# Patient Record
Sex: FEMALE | Race: BLACK OR AFRICAN AMERICAN | NOT HISPANIC OR LATINO | Employment: UNEMPLOYED | URBAN - METROPOLITAN AREA
[De-identification: names, ages, dates, MRNs, and addresses within clinical notes are randomized per-mention and may not be internally consistent; named-entity substitution may affect disease eponyms.]

---

## 2024-05-30 DIAGNOSIS — O09.292 HISTORY OF STILLBIRTH IN CURRENTLY PREGNANT PATIENT, SECOND TRIMESTER: Primary | ICD-10-CM

## 2024-06-19 DIAGNOSIS — O09.292 HISTORY OF STILLBIRTH IN CURRENTLY PREGNANT PATIENT, SECOND TRIMESTER: Primary | ICD-10-CM

## 2024-06-19 DIAGNOSIS — Z36.89 ENCOUNTER FOR FETAL ANATOMIC SURVEY: ICD-10-CM

## 2024-06-24 PROBLEM — O09.90 AT HIGH RISK FOR COMPLICATIONS OF INTRAUTERINE PREGNANCY (IUP): Status: ACTIVE | Noted: 2024-06-24

## 2024-06-24 PROBLEM — O46.8X2 SUBCHORIONIC HEMORRHAGE OF PLACENTA IN SECOND TRIMESTER: Status: ACTIVE | Noted: 2024-06-24

## 2024-06-24 PROBLEM — O99.012 ANEMIA AFFECTING PREGNANCY IN SECOND TRIMESTER: Status: ACTIVE | Noted: 2024-06-24

## 2024-06-24 PROBLEM — O09.292 PRIOR PREGNANCY WITH FETAL DEMISE AND CURRENT PREGNANCY IN SECOND TRIMESTER: Status: ACTIVE | Noted: 2024-06-24

## 2024-06-24 NOTE — PROGRESS NOTES
Maternal Fetal Medicine New Consult    Subjective:     Patient ID: [id number]    Chief Complaint: mfm consult w/us (H/O Stillbirth)      HPI: 24 y.o.  female  at 17w0d gestation with Estimated Date of Delivery:  12/3/2024 by LMP, consistent with early US. She is sent for MFM consultation for history of stillbirth at 20 weeks.     She has history of stillbirth at 20 weeks in her last pregnancy.  She reports that she woke up drenched in fluid around 15 weeks.  She reports that she went into the OBs office and was confirmed to have rupture membranes at that time.  She was sent home for expectant management. Reports went back to OB a few times between that time and baby was still alive, but around 20 weeks went in for follow-up and had lost fetal heartbeat.  On 2024, she had labs that showed anemia with H&H 10.7/34.6.  Iron studies on 2024 showed iron 46, TIBC 463.         She denies any personal or family history of aneuploidy or anomalies. She denies any exposure to high fevers, viral rashes, illicit drugs or alcohol in this pregnancy.  She denies any leaking fluid, vaginal bleeding, contractions, decreased fetal movement. Denies headaches, visual disturbances, or epigastric pain.       Pregnancy complications include:  Patient Active Problem List   Diagnosis    At high risk for complications of intrauterine pregnancy (IUP)    Anemia affecting pregnancy in second trimester    Prior pregnancy with fetal demise and current pregnancy in second trimester    History of  premature rupture of membranes (PROM) in previous pregnancy, currently pregnant in second trimester       Past Medical History:   Diagnosis Date    Bipolar 1 disorder     not currently    Depression     not currently       Past Surgical History:   Procedure Laterality Date    DILATION AND CURETTAGE OF UTERUS      TONSILLECTOMY, ADENOIDECTOMY      TYMPANOSTOMY TUBE PLACEMENT      WISDOM TOOTH EXTRACTION       "All four removed       Family History   Problem Relation Name Age of Onset    Hypertension Maternal Grandmother      Diabetes Maternal Grandmother       labor Mother         Social History     Socioeconomic History    Marital status: Single   Tobacco Use    Smoking status: Never     Passive exposure: Current    Smokeless tobacco: Never   Substance and Sexual Activity    Alcohol use: Not Currently    Drug use: Not Currently     Types: Marijuana    Sexual activity: Yes     Partners: Male       Current Outpatient Medications   Medication Sig Dispense Refill    prenatal vit/iron fum/folic ac (PRENATAL 1+1 ORAL) Take by mouth.      ascorbic acid, vitamin C, (VITAMIN C) 250 MG tablet Take 1 tablet (250 mg total) by mouth every other day. 20 tablet 3    aspirin (ECOTRIN) 81 MG EC tablet Take 1 tablet (81 mg total) by mouth once daily. 30 tablet 5    ferrous sulfate (FEOSOL) 325 mg (65 mg iron) Tab tablet Take 1 tablet (325 mg total) by mouth every other day. 20 tablet 3    folic acid (FOLVITE) 1 MG tablet Take 1 tablet (1 mg total) by mouth once daily. 30 tablet 3     No current facility-administered medications for this visit.       Review of patient's allergies indicates:  No Known Allergies     Review of Systems   12 point review of systems conducted, negative except as stated in the history of present illness. See HPI for details.      Objective:     Visit Vitals  /67 (BP Location: Left arm, Patient Position: Sitting, BP Method: Large (Automatic))   Pulse 94   Ht 5' 2" (1.575 m)   Wt 62.5 kg (137 lb 12.8 oz)   LMP 2024   BMI 25.20 kg/m²        Physical Exam    Assessment/Plan:     24 y.o.  female with IUP at 17w0d    History of premature rupture of membranes at 15 weeks with still birth at 20 weeks  I discussed with her the increased risk of recurrence of another  delivery with risk around 1/3. The risk ranges from 15% with one previous  delivery after 32 weeks that was followed " by a term at birth to 60% with history of 2 consecutive  deliveries before 32 weeks. I have shared with her that the  delivery could occur at an earlier gestational age with more significant morbidity and high risk of  mortality associated with that.    Discussed withdrawal of 17P by FDA and new guidelines for management of previous  delivery. Recommend intermittent transvaginal ultrasounds starting at 16 weeks until 23 weeks. If cervical length is less than 3 cm, weekly TVUS is recommended and may consider vaginal progesterone nightly. If cervix is less than 25 mm, consider cerclage, especially if previous  delivery less 28 weeks, than along with continuing vaginal progesterone nightly. Conversely, may consider nightly vaginal progesterone nightly starting at 16 weeks until 37 weeks regardless of cervical length, although no data of benefit if cervix longer than 3 cm. Discussed risks/benefits of all options, understanding that neither option is fully protective against pregnancy loss. She would like to do cervical surveillance at this time.    TVUS today 2024  with normal closed cervix 3.2 cm without funneling at the IO. No evidence of cervical insufficiency at this time. Will recheck in about 2 weeks. PTL precautions given.    Will plan to do TVUS intermittently until 24 weeks. If cervical length less than 3 cm, will consider vaginal progesterone at that time. If cervical length less than 2.5 cm, consider cerclage along with vaginal progesterone. PTL precautions given.    There is rare but possible risk of other contributing and interacting factors that may be associated with stillbirth, especially in this case. However, out of caution, discussed option of testing for Antiphospholipid antibody syndrome, although low index of suspicion with her report ROM at 15 weeks. She accepted APS testing. The initial diagnosis of APS requires testing for anticardiolipin antibodies and  the lupus anticoagulant. A lupus anticoagulant is interpreted as present or absent. Anticardiolipin antibodies must be greater than the 99th percentile to be considered clinically significant. Positive results require a repeat test after 12 weeks to exclude a transient, clinically unimportant false positive antibody test. If confirmed positive, would need treatment with baby aspirin and heparin    Anemia in pregnancy  The World Health Organization (WHO) defines anemia as a hemoglobin level <11 g/dL (approximately equivalent to a hematocrit <33 percent) in the first trimester, <10.5 g/dL in the second trimester, <10.5 to 11 g/dL in the third trimester, or <10 g/dL postpartum. Anemia affects approximately 30 percent of reproductive-age females and 40 percent of pregnant individuals, mostly due to iron deficiency.       Physiologic anemia of pregnancy and iron deficiency are the two most common causes of anemia in pregnancy. Much less common causes of anemia include hemoglobinopathies (sickle cell, thalassemia), RBC membrane disorders (hereditary spherocytosis and had hereditary Elliptocytosis), and acquired anemias (folate deficiency, vitamin B12 deficiency, other vitamin deficiencies, autoimmune hemolytic anemia Anemia, hypothyroidism and chronic kidney disease). All gravidas with anemia or symptoms of anemia should have prompt testing for iron deficiency because iron deficiency can progress to anemia; iron deficiency is the most common pathologic cause of anemia in pregnancy.    I discussed with her that iron deficiency during the first two trimesters of pregnancy is associated with a 2-fold increased risk for  delivery and a 3-fold increased risk for delivering a low-birth-weight baby.    We will start the patient oral hematinic therapy with ferrous sulfate 325 mg every other day, vitamin-C 250 mg every other day, and folic acid 1 mg daily.  Recommend intermittent CBC throughout pregnancy to assess response  to therapy.  Order was given for follow-up CBC today.    Preeclampsia prophylaxis  With her risk factors for preeclampsia including   ancestry and low socioeconomic status, discussed recommendations for low dose aspirin use to decrease risks for adverse outcomes, including preeclampsia, low birth rate and  delivery. Low-dose aspirin reduced the risk for preeclampsia by 15% in clinical trials and reduced the risk for  birth by 20% and FGR by 20%, and  mortality by around 20%.  After discussing risks/benefits of its use, it was agreed to start asa 81 mg daily today and continue until delivery.. Also, recommend using in all future pregnancies.      Patient was counseled on the risk of recurrence of  labor and delivery that could lead to pregnancy lost if prior to viability.  We will plan to monitor cervix every 2 weeks.  Out of caution elected to do APS testing with a fetal demise in utero although the most likely etiology is prolonged anhydramnios.  Start patient on daily aspirin decrease the risk of preeclampsia.    Follow up in about 2 weeks (around 2024) for [facility] follow-up, Transvaginal ultrasound.     Future Appointments   Date Time Provider Department Center   2024 10:00 AM ROOM 3, [facility] [address] [place]   2024 10:30 AM Edgar Clayton MD [facility] [address] [place]        Patient was evaluated by [facility], and and Dr. Clayton.  Final assessment and recommendations as stated above were made by Dr. Clayton.    This note was created with the assistance of [employer] voice recognition software. There may be transcription errors as a result of using this technology, however minimal. Effort has been made to ensure accuracy of transcription, but any obvious errors or omissions should be clarified with the author of the document.

## 2024-06-25 ENCOUNTER — PROCEDURE VISIT (OUTPATIENT)
Dept: MATERNAL FETAL MEDICINE | Facility: CLINIC | Age: 24
End: 2024-06-25
Payer: MEDICAID

## 2024-06-25 ENCOUNTER — OFFICE VISIT (OUTPATIENT)
Dept: MATERNAL FETAL MEDICINE | Facility: CLINIC | Age: 24
End: 2024-06-25
Payer: MEDICAID

## 2024-06-25 VITALS
HEART RATE: 94 BPM | DIASTOLIC BLOOD PRESSURE: 67 MMHG | HEIGHT: 62 IN | WEIGHT: 137.81 LBS | SYSTOLIC BLOOD PRESSURE: 110 MMHG | BODY MASS INDEX: 25.36 KG/M2

## 2024-06-25 DIAGNOSIS — O09.292 HISTORY OF STILLBIRTH IN CURRENTLY PREGNANT PATIENT, SECOND TRIMESTER: ICD-10-CM

## 2024-06-25 DIAGNOSIS — Z03.73 SUSPECTED FETAL ANOMALY NOT FOUND: ICD-10-CM

## 2024-06-25 DIAGNOSIS — O09.90 AT HIGH RISK FOR COMPLICATIONS OF INTRAUTERINE PREGNANCY (IUP): Primary | ICD-10-CM

## 2024-06-25 DIAGNOSIS — Z36.89 ENCOUNTER FOR FETAL ANATOMIC SURVEY: ICD-10-CM

## 2024-06-25 DIAGNOSIS — O09.292 PRIOR PREGNANCY WITH FETAL DEMISE AND CURRENT PREGNANCY IN SECOND TRIMESTER: ICD-10-CM

## 2024-06-25 DIAGNOSIS — O09.292 HISTORY OF PRETERM PREMATURE RUPTURE OF MEMBRANES (PROM) IN PREVIOUS PREGNANCY, CURRENTLY PREGNANT IN SECOND TRIMESTER: ICD-10-CM

## 2024-06-25 DIAGNOSIS — O99.012 ANEMIA AFFECTING PREGNANCY IN SECOND TRIMESTER: ICD-10-CM

## 2024-06-25 RX ORDER — FOLIC ACID 1 MG/1
1 TABLET ORAL DAILY
Qty: 30 TABLET | Refills: 3 | Status: SHIPPED | OUTPATIENT
Start: 2024-06-25 | End: 2024-10-23

## 2024-06-25 RX ORDER — FERROUS SULFATE 325(65) MG
325 TABLET ORAL EVERY OTHER DAY
Qty: 20 TABLET | Refills: 3 | Status: SHIPPED | OUTPATIENT
Start: 2024-06-25

## 2024-06-25 RX ORDER — ASCORBIC ACID 250 MG
250 TABLET ORAL EVERY OTHER DAY
Qty: 20 TABLET | Refills: 3 | Status: SHIPPED | OUTPATIENT
Start: 2024-06-25

## 2024-06-25 RX ORDER — ASPIRIN 81 MG/1
81 TABLET ORAL DAILY
Qty: 30 TABLET | Refills: 5 | Status: SHIPPED | OUTPATIENT
Start: 2024-06-25 | End: 2024-12-02

## 2024-07-03 DIAGNOSIS — O09.292 HISTORY OF PRETERM PREMATURE RUPTURE OF MEMBRANES (PROM) IN PREVIOUS PREGNANCY, CURRENTLY PREGNANT IN SECOND TRIMESTER: ICD-10-CM

## 2024-07-03 DIAGNOSIS — O99.012 ANEMIA AFFECTING PREGNANCY IN SECOND TRIMESTER: ICD-10-CM

## 2024-07-03 DIAGNOSIS — O09.292 HISTORY OF STILLBIRTH IN CURRENTLY PREGNANT PATIENT, SECOND TRIMESTER: Primary | ICD-10-CM

## 2024-07-08 NOTE — H&P (VIEW-ONLY)
Maternal Fetal Medicine Follow Up    Subjective:     Patient ID: [id number]    Chief Complaint: [facility] follow up with US      HPI: Amanuel Jacobson is a 24 y.o. female  at 19w0d gestation with Estimated Date of Delivery: 12/3/24  who is here for follow-up consultation by M.    She has history of stillbirth at 20 weeks in her last pregnancy.  She reports that she woke up drenched in fluid around 15 weeks.  She reports that she went into the OBs office and was confirmed to have rupture membranes at that time.  She was sent home for expectant management. Reports went back to OB a few times between that time and baby was still alive, but around 20 weeks went in for follow-up and had lost fetal heartbeat.  APS testing was ordered on 2024, which she reports she did (calling for result).  On 2024, she had labs that showed anemia with H&H 10.7/34.6.  Iron studies on 2024 showed iron 46, TIBC 463.  She is on oral hematinic therapy.  She is on low-dose aspirin daily.       Interval history since last [facility] visit: None.. She denies any leaking fluid, vaginal bleeding, contractions, decreased fetal movement. Denies headaches, visual disturbances, or epigastric pain.    Pregnancy complications include:   Patient Active Problem List   Diagnosis    At high risk for complications of intrauterine pregnancy (IUP)    Anemia affecting pregnancy in second trimester    Prior pregnancy with fetal demise and current pregnancy in second trimester    History of  premature rupture of membranes (PROM) in previous pregnancy, currently pregnant in second trimester    Cervical shortening in second trimester       No changes to medical, surgical, family, social, or obstetric history.    Medications:  Current Outpatient Medications   Medication Instructions    ascorbic acid (vitamin C) (VITAMIN C) 250 mg, Oral, Every other day    aspirin (ECOTRIN) 81 mg, Oral, Daily    ferrous sulfate (FEOSOL) 325 mg, Oral, Every other day  "   folic acid (FOLVITE) 1 mg, Oral, Daily    prenatal vit/iron fum/folic ac (PRENATAL 1+1 ORAL) Oral    progesterone (PROMETRIUM) 200 mg, Vaginal, Nightly       Review of Systems   12 point review of systems conducted, negative except as stated in the history of present illness. See HPI for details.      Objective:     Visit Vitals  /68 (BP Location: Left arm, Patient Position: Sitting, BP Method: Medium (Automatic))   Pulse 89   Ht 5' 2" (1.575 m)   Wt 61.7 kg (136 lb)   LMP 2024   BMI 24.87 kg/m²        Physical Exam  Vitals and nursing note reviewed.   Constitutional:       Appearance: Normal appearance.   HENT:      Head: Normocephalic and atraumatic.      Nose: Nose normal. No congestion.   Cardiovascular:      Rate and Rhythm: Normal rate.   Pulmonary:      Effort: Pulmonary effort is normal.   Skin:     Findings: No rash.   Neurological:      Mental Status: She is alert and oriented to person, place, and time.   Psychiatric:         Mood and Affect: Mood normal.         Behavior: Behavior normal.         Thought Content: Thought content normal.         Judgment: Judgment normal.         Assessment/Plan:     24 y.o.  female with IUP at 19w0d    History of premature rupture of membranes at 15 weeks with still birth at 20 weeks, with cervical shortening scheduled cerclage as discussed below  FHT present per US today (2024).     Previously discussed withdrawal of 17P by FDA and new guidelines for management of previous  delivery. She is currently in cervical length surveillance.     TVUS today 2024  with closed relatively short cervix 2.7 cm without funneling at the IO.  Discussed benefits and risks of vaginal progesterone and agreed to start nightly vaginal progesterone, 200 mg micronized progesterone q.h.s. PTL precautions given.     APS testing was ordered on 2024, which she reports she did (calling for result).      Discussed with her the association of short cervix " with increased risk of  delivery. I discussed the benefits (prolonging pregnancy and improving outcomes with cervix under 2.5 cm, in someone with previous  delivery) and risks options including cervical cerclage and alternatives of cervical cerclage including expectant management, limited activity and pelvic rest.     With cervix shortened from 3.3 cm in 2 weeks and likely further shortening of the pregnancy advances, with most likely getting less than 2.5 cm before 23 weeks and probably sooner, the options of continued close surveillance with a weekly cervical length versus proceeding with cerclage at this time were reviewed and discussed.  Patient would like to proceed with cerclage..     I also discussed higher risks of intraamniotic infection with short cervix. I discussed risks/benefits and offered amniocentesis to check for intraamniotic infection. She declined amniocentesis to check for infection. A short cervix of less or equal to 5 mm is associated with intraamniotic inflammation, as measured by the presence of intraamniotic cytokines, regardless of whether labor or infection is present. Advised, that if infection is present, then neither vaginal progesterone nor cerclage would help and onset of labor will occur that would lead to pregnancy loss.       She was advised that risks of the cerclage including the risk of ruptured membrane at the time of surgery that could lead to pregnancy loss, risk of anesthesia, pain, infection, hemorrhage, risk of significant bleeding that could lead to pregnancy loss, hysterectomy in emergency, risk of injury to adjacent structures including risk of bladder, bowel, or ureter injury, risk of fistula formation, as well as the risk of prolonging the pregnancy to periviability with delivery of very premature fetus with all the complications of prematurity. The potential inability to place cerclage and potential of failure to prevent  delivery were  discussed.  Her questions were answered, and she would like to have the cerclage, which will be scheduled within the next week. Will also plan to give course of Indocin., around the surgery time.    We will plan to see her back 1-2 weeks after cerclage for post-operative exam. Routine cervical length monitoring is not recommended following cerclage placement. She was advised to monitor for signs of symptoms of  labor (LOF, vaginal bleeding, regular contractions) and signs of infection and to go to OB ED for any concerns of PTL or infection.     Will plan for cerclage removal around 36-37 weeks, unless indicated earlier. PTL precautions given.         Anemia in pregnancy  Anemia in pregnancy is associated with a 2-fold increased risk for  delivery and 3-fold increased risk for delivering a low birth weight baby.      Continue oral hematinic therapy. Recommend intermittent H/H throughout the pregnancy to assess response to supplementation and guide titration of dosing.  Follow-up CBC was ordered today.      Preeclampsia prophylaxis  With her increased risk for preeclampsia, she agreed to continue asa 81 mg daily until delivery. Preeclampsia precautions reviewed.       Follow up in about 1 week (around 2024) for MFM follow-up, Transvaginal ultrasound.     No future appointments.       CHEPE involvement: Patient was evaluated and examined by Dr. Clayton. MISHA Lopez, helped in pre charting of part of note.    This note was created with the assistance of [employer] voice recognition software. There may be transcription errors as a result of using this technology, however minimal. Effort has been made to ensure accuracy of transcription, but any obvious errors or omissions should be clarified with the author of the document.

## 2024-07-08 NOTE — PROGRESS NOTES
Maternal Fetal Medicine Follow Up    Subjective:     Patient ID: [id number]    Chief Complaint: [facility] follow up with US      HPI: Amanuel Jacobson is a 24 y.o. female  at 19w0d gestation with Estimated Date of Delivery: 12/3/24  who is here for follow-up consultation by M.    She has history of stillbirth at 20 weeks in her last pregnancy.  She reports that she woke up drenched in fluid around 15 weeks.  She reports that she went into the OBs office and was confirmed to have rupture membranes at that time.  She was sent home for expectant management. Reports went back to OB a few times between that time and baby was still alive, but around 20 weeks went in for follow-up and had lost fetal heartbeat.  APS testing was ordered on 2024, which she reports she did (calling for result).  On 2024, she had labs that showed anemia with H&H 10.7/34.6.  Iron studies on 2024 showed iron 46, TIBC 463.  She is on oral hematinic therapy.  She is on low-dose aspirin daily.       Interval history since last [facility] visit: None.. She denies any leaking fluid, vaginal bleeding, contractions, decreased fetal movement. Denies headaches, visual disturbances, or epigastric pain.    Pregnancy complications include:   Patient Active Problem List   Diagnosis    At high risk for complications of intrauterine pregnancy (IUP)    Anemia affecting pregnancy in second trimester    Prior pregnancy with fetal demise and current pregnancy in second trimester    History of  premature rupture of membranes (PROM) in previous pregnancy, currently pregnant in second trimester    Cervical shortening in second trimester       No changes to medical, surgical, family, social, or obstetric history.    Medications:  Current Outpatient Medications   Medication Instructions    ascorbic acid (vitamin C) (VITAMIN C) 250 mg, Oral, Every other day    aspirin (ECOTRIN) 81 mg, Oral, Daily    ferrous sulfate (FEOSOL) 325 mg, Oral, Every other day  "   folic acid (FOLVITE) 1 mg, Oral, Daily    prenatal vit/iron fum/folic ac (PRENATAL 1+1 ORAL) Oral    progesterone (PROMETRIUM) 200 mg, Vaginal, Nightly       Review of Systems   12 point review of systems conducted, negative except as stated in the history of present illness. See HPI for details.      Objective:     Visit Vitals  /68 (BP Location: Left arm, Patient Position: Sitting, BP Method: Medium (Automatic))   Pulse 89   Ht 5' 2" (1.575 m)   Wt 61.7 kg (136 lb)   LMP 2024   BMI 24.87 kg/m²        Physical Exam  Vitals and nursing note reviewed.   Constitutional:       Appearance: Normal appearance.   HENT:      Head: Normocephalic and atraumatic.      Nose: Nose normal. No congestion.   Cardiovascular:      Rate and Rhythm: Normal rate.   Pulmonary:      Effort: Pulmonary effort is normal.   Skin:     Findings: No rash.   Neurological:      Mental Status: She is alert and oriented to person, place, and time.   Psychiatric:         Mood and Affect: Mood normal.         Behavior: Behavior normal.         Thought Content: Thought content normal.         Judgment: Judgment normal.         Assessment/Plan:     24 y.o.  female with IUP at 19w0d    History of premature rupture of membranes at 15 weeks with still birth at 20 weeks, with cervical shortening scheduled cerclage as discussed below  FHT present per US today (2024).     Previously discussed withdrawal of 17P by FDA and new guidelines for management of previous  delivery. She is currently in cervical length surveillance.     TVUS today 2024  with closed relatively short cervix 2.7 cm without funneling at the IO.  Discussed benefits and risks of vaginal progesterone and agreed to start nightly vaginal progesterone, 200 mg micronized progesterone q.h.s. PTL precautions given.     APS testing was ordered on 2024, which she reports she did (calling for result).      Discussed with her the association of short cervix " with increased risk of  delivery. I discussed the benefits (prolonging pregnancy and improving outcomes with cervix under 2.5 cm, in someone with previous  delivery) and risks options including cervical cerclage and alternatives of cervical cerclage including expectant management, limited activity and pelvic rest.     With cervix shortened from 3.3 cm in 2 weeks and likely further shortening of the pregnancy advances, with most likely getting less than 2.5 cm before 23 weeks and probably sooner, the options of continued close surveillance with a weekly cervical length versus proceeding with cerclage at this time were reviewed and discussed.  Patient would like to proceed with cerclage..     I also discussed higher risks of intraamniotic infection with short cervix. I discussed risks/benefits and offered amniocentesis to check for intraamniotic infection. She declined amniocentesis to check for infection. A short cervix of less or equal to 5 mm is associated with intraamniotic inflammation, as measured by the presence of intraamniotic cytokines, regardless of whether labor or infection is present. Advised, that if infection is present, then neither vaginal progesterone nor cerclage would help and onset of labor will occur that would lead to pregnancy loss.       She was advised that risks of the cerclage including the risk of ruptured membrane at the time of surgery that could lead to pregnancy loss, risk of anesthesia, pain, infection, hemorrhage, risk of significant bleeding that could lead to pregnancy loss, hysterectomy in emergency, risk of injury to adjacent structures including risk of bladder, bowel, or ureter injury, risk of fistula formation, as well as the risk of prolonging the pregnancy to periviability with delivery of very premature fetus with all the complications of prematurity. The potential inability to place cerclage and potential of failure to prevent  delivery were  discussed.  Her questions were answered, and she would like to have the cerclage, which will be scheduled within the next week. Will also plan to give course of Indocin., around the surgery time.    We will plan to see her back 1-2 weeks after cerclage for post-operative exam. Routine cervical length monitoring is not recommended following cerclage placement. She was advised to monitor for signs of symptoms of  labor (LOF, vaginal bleeding, regular contractions) and signs of infection and to go to OB ED for any concerns of PTL or infection.     Will plan for cerclage removal around 36-37 weeks, unless indicated earlier. PTL precautions given.         Anemia in pregnancy  Anemia in pregnancy is associated with a 2-fold increased risk for  delivery and 3-fold increased risk for delivering a low birth weight baby.      Continue oral hematinic therapy. Recommend intermittent H/H throughout the pregnancy to assess response to supplementation and guide titration of dosing.  Follow-up CBC was ordered today.      Preeclampsia prophylaxis  With her increased risk for preeclampsia, she agreed to continue asa 81 mg daily until delivery. Preeclampsia precautions reviewed.       Follow up in about 1 week (around 2024) for MFM follow-up, Transvaginal ultrasound.     No future appointments.       CHEPE involvement: Patient was evaluated and examined by Dr. Clayton. MISHA Lopez, helped in pre charting of part of note.    This note was created with the assistance of [employer] voice recognition software. There may be transcription errors as a result of using this technology, however minimal. Effort has been made to ensure accuracy of transcription, but any obvious errors or omissions should be clarified with the author of the document.

## 2024-07-09 ENCOUNTER — OFFICE VISIT (OUTPATIENT)
Dept: MATERNAL FETAL MEDICINE | Facility: CLINIC | Age: 24
End: 2024-07-09
Payer: MEDICAID

## 2024-07-09 ENCOUNTER — PROCEDURE VISIT (OUTPATIENT)
Dept: MATERNAL FETAL MEDICINE | Facility: CLINIC | Age: 24
End: 2024-07-09
Payer: MEDICAID

## 2024-07-09 ENCOUNTER — TELEPHONE (OUTPATIENT)
Dept: MATERNAL FETAL MEDICINE | Facility: CLINIC | Age: 24
End: 2024-07-09

## 2024-07-09 VITALS
BODY MASS INDEX: 25.03 KG/M2 | HEIGHT: 62 IN | SYSTOLIC BLOOD PRESSURE: 113 MMHG | WEIGHT: 136 LBS | HEART RATE: 89 BPM | DIASTOLIC BLOOD PRESSURE: 68 MMHG

## 2024-07-09 DIAGNOSIS — O34.32 CERVICAL INCOMPETENCE AFFECTING MANAGEMENT OF PREGNANCY IN SECOND TRIMESTER, ANTEPARTUM: Primary | ICD-10-CM

## 2024-07-09 DIAGNOSIS — O09.292 HISTORY OF PRETERM PREMATURE RUPTURE OF MEMBRANES (PROM) IN PREVIOUS PREGNANCY, CURRENTLY PREGNANT IN SECOND TRIMESTER: ICD-10-CM

## 2024-07-09 DIAGNOSIS — O09.292 HISTORY OF STILLBIRTH IN CURRENTLY PREGNANT PATIENT, SECOND TRIMESTER: ICD-10-CM

## 2024-07-09 DIAGNOSIS — O09.292 PRIOR PREGNANCY WITH FETAL DEMISE AND CURRENT PREGNANCY IN SECOND TRIMESTER: ICD-10-CM

## 2024-07-09 DIAGNOSIS — O34.32 CERVICAL INCOMPETENCE AFFECTING MANAGEMENT OF PREGNANCY IN SECOND TRIMESTER, ANTEPARTUM: ICD-10-CM

## 2024-07-09 DIAGNOSIS — O99.012 ANEMIA AFFECTING PREGNANCY IN SECOND TRIMESTER: ICD-10-CM

## 2024-07-09 DIAGNOSIS — O26.872 CERVICAL SHORTENING IN SECOND TRIMESTER: ICD-10-CM

## 2024-07-09 DIAGNOSIS — O99.012 ANEMIA AFFECTING PREGNANCY IN SECOND TRIMESTER: Primary | ICD-10-CM

## 2024-07-09 DIAGNOSIS — O09.90 AT HIGH RISK FOR COMPLICATIONS OF INTRAUTERINE PREGNANCY (IUP): ICD-10-CM

## 2024-07-09 PROCEDURE — 3008F BODY MASS INDEX DOCD: CPT | Mod: CPTII,S$GLB,, | Performed by: OBSTETRICS & GYNECOLOGY

## 2024-07-09 PROCEDURE — 99215 OFFICE O/P EST HI 40 MIN: CPT | Mod: TH,S$GLB,, | Performed by: OBSTETRICS & GYNECOLOGY

## 2024-07-09 PROCEDURE — 3074F SYST BP LT 130 MM HG: CPT | Mod: CPTII,S$GLB,, | Performed by: OBSTETRICS & GYNECOLOGY

## 2024-07-09 PROCEDURE — 76817 TRANSVAGINAL US OBSTETRIC: CPT | Mod: S$GLB,,, | Performed by: OBSTETRICS & GYNECOLOGY

## 2024-07-09 PROCEDURE — 1159F MED LIST DOCD IN RCRD: CPT | Mod: CPTII,S$GLB,, | Performed by: OBSTETRICS & GYNECOLOGY

## 2024-07-09 PROCEDURE — 3078F DIAST BP <80 MM HG: CPT | Mod: CPTII,S$GLB,, | Performed by: OBSTETRICS & GYNECOLOGY

## 2024-07-09 PROCEDURE — 1160F RVW MEDS BY RX/DR IN RCRD: CPT | Mod: CPTII,S$GLB,, | Performed by: OBSTETRICS & GYNECOLOGY

## 2024-07-09 RX ORDER — INDOMETHACIN 25 MG/1
25 CAPSULE ORAL
Qty: 8 CAPSULE | Refills: 0 | Status: SHIPPED | OUTPATIENT
Start: 2024-07-11 | End: 2024-07-13

## 2024-07-09 RX ORDER — PROGESTERONE 200 MG/1
200 CAPSULE ORAL NIGHTLY
Qty: 30 CAPSULE | Refills: 4 | Status: SHIPPED | OUTPATIENT
Start: 2024-07-09 | End: 2024-12-06

## 2024-07-09 NOTE — TELEPHONE ENCOUNTER
Called patient. Notified of scheduled cerclage at [facility] on 7/11/24@7am with 5:15am arrival, NPO@midnight. Patient questions answered. Patient verbalized understanding.

## 2024-07-10 ENCOUNTER — ANESTHESIA EVENT (OUTPATIENT)
Dept: OBSTETRICS AND GYNECOLOGY | Facility: HOSPITAL | Age: 24
End: 2024-07-10
Payer: MEDICAID

## 2024-07-10 NOTE — ANESTHESIA PREPROCEDURE EVALUATION
"                                                                                                             07/10/2024  Amanuel Jacobson is a 24 y.o., female presents with cervical shortening at 19W2d for cerclage placement  "HPI: 24 y.o.  female  at 17w0d gestation with Estimated Date of Delivery:  12/3/2024 by LMP, consistent with early US. She is sent for MFM consultation for history of stillbirth at 20 weeks.      She has history of stillbirth at 20 weeks in her last pregnancy.  She reports that she woke up drenched in fluid around 15 weeks.  She reports that she went into the OBs office and was confirmed to have rupture membranes at that time.  She was sent home for expectant management. Reports went back to OB a few times between that time and baby was still alive, but around 20 weeks went in for follow-up and had lost fetal heartbeat.  On 2024, she had labs that showed anemia with H&H 10.7/34.6.  Iron studies on 2024 showed iron 46, TIBC 463. "        Pre-op Assessment    I have reviewed the NPO Status.      Review of Systems  Psych:  Psychiatric History                  Physical Exam  General: Well nourished, Cooperative, Alert and Oriented    Airway:  Mallampati: III   Mouth Opening: Normal  TM Distance: Normal  Tongue: Normal  Neck ROM: Normal ROM    Dental:  Intact    Chest/Lungs:  Clear to auscultation, Normal Respiratory Rate    Heart:  Rate: Normal  Rhythm: Regular Rhythm    Abdomen:  gravid      Anesthesia Plan  Type of Anesthesia, risks & benefits discussed:    Anesthesia Type: Spinal  Intra-op Monitoring Plan: Standard ASA Monitors  Post Op Pain Control Plan: IV/PO Opioids PRN  Informed Consent: Informed consent signed with the Patient and all parties understand the risks and agree with anesthesia plan.  All questions answered.   ASA Score: 2  Day of Surgery Review of History & Physical: H&P Update referred to the surgeon/provider.  Anesthesia Plan Notes: Premedication zofran and " ofirmev upon entry into OR  Technique: spinal with low dose Bupiv 0.75% vs Clorotekal (cholorprocaine)  Induction: low dose propofol infusion ONLY if patient is too anxious to tolerate being awake in the OR      Ready For Surgery From Anesthesia Perspective.     .

## 2024-07-11 ENCOUNTER — HOSPITAL ENCOUNTER (OUTPATIENT)
Facility: HOSPITAL | Age: 24
LOS: 1 days | Discharge: HOME | End: 2024-07-11
Attending: OBSTETRICS & GYNECOLOGY | Admitting: OBSTETRICS & GYNECOLOGY
Payer: MEDICAID

## 2024-07-11 ENCOUNTER — ANESTHESIA (OUTPATIENT)
Dept: OBSTETRICS AND GYNECOLOGY | Facility: HOSPITAL | Age: 24
End: 2024-07-11
Payer: MEDICAID

## 2024-07-11 VITALS
SYSTOLIC BLOOD PRESSURE: 116 MMHG | TEMPERATURE: 98 F | OXYGEN SATURATION: 100 % | WEIGHT: 136 LBS | HEART RATE: 90 BPM | HEIGHT: 62 IN | RESPIRATION RATE: 16 BRPM | DIASTOLIC BLOOD PRESSURE: 62 MMHG | BODY MASS INDEX: 25.03 KG/M2

## 2024-07-11 DIAGNOSIS — O34.32 MATERNAL CARE FOR CERVICAL INCOMPETENCE IN SECOND TRIMESTER: Primary | ICD-10-CM

## 2024-07-11 LAB
ABORH RETYPE: NORMAL
BACTERIA #/AREA URNS AUTO: ABNORMAL /HPF
BASOPHILS # BLD AUTO: 0.05 X10(3)/MCL
BASOPHILS NFR BLD AUTO: 0.6 %
BILIRUB UR QL STRIP.AUTO: NEGATIVE
CAOX CRY UR QL COMP ASSIST: ABNORMAL
CLARITY UR: CLEAR
COLOR UR AUTO: YELLOW
EOSINOPHIL # BLD AUTO: 0.26 X10(3)/MCL (ref 0–0.9)
EOSINOPHIL NFR BLD AUTO: 3 %
ERYTHROCYTE [DISTWIDTH] IN BLOOD BY AUTOMATED COUNT: 17.2 % (ref 11.5–17)
GLUCOSE UR QL STRIP: NORMAL
GROUP & RH: NORMAL
HCT VFR BLD AUTO: 34.1 % (ref 37–47)
HGB BLD-MCNC: 10.7 G/DL (ref 12–16)
HGB UR QL STRIP: NEGATIVE
IMM GRANULOCYTES # BLD AUTO: 0.06 X10(3)/MCL (ref 0–0.04)
IMM GRANULOCYTES NFR BLD AUTO: 0.7 %
INDIRECT COOMBS: NORMAL
KETONES UR QL STRIP: NEGATIVE
LEUKOCYTE ESTERASE UR QL STRIP: NEGATIVE
LYMPHOCYTES # BLD AUTO: 2 X10(3)/MCL (ref 0.6–4.6)
LYMPHOCYTES NFR BLD AUTO: 23.4 %
MCH RBC QN AUTO: 23.9 PG (ref 27–31)
MCHC RBC AUTO-ENTMCNC: 31.4 G/DL (ref 33–36)
MCV RBC AUTO: 76.1 FL (ref 80–94)
MONOCYTES # BLD AUTO: 0.78 X10(3)/MCL (ref 0.1–1.3)
MONOCYTES NFR BLD AUTO: 9.1 %
MUCOUS THREADS URNS QL MICRO: ABNORMAL /LPF
NEUTROPHILS # BLD AUTO: 5.4 X10(3)/MCL (ref 2.1–9.2)
NEUTROPHILS NFR BLD AUTO: 63.2 %
NITRITE UR QL STRIP: NEGATIVE
NRBC BLD AUTO-RTO: 0 %
PH UR STRIP: 6 [PH]
PLATELET # BLD AUTO: 263 X10(3)/MCL (ref 130–400)
PMV BLD AUTO: 11.1 FL (ref 7.4–10.4)
PROT UR QL STRIP: NEGATIVE
RBC # BLD AUTO: 4.48 X10(6)/MCL (ref 4.2–5.4)
RBC #/AREA URNS AUTO: ABNORMAL /HPF
SP GR UR STRIP.AUTO: 1.03 (ref 1–1.03)
SPECIMEN OUTDATE: NORMAL
SQUAMOUS #/AREA URNS LPF: ABNORMAL /HPF
T PALLIDUM AB SER QL: NONREACTIVE
UROBILINOGEN UR STRIP-ACNC: 2
WBC # BLD AUTO: 8.55 X10(3)/MCL (ref 4.5–11.5)
WBC #/AREA URNS AUTO: ABNORMAL /HPF

## 2024-07-11 PROCEDURE — 71000033 HC RECOVERY, INTIAL HOUR: Performed by: OBSTETRICS & GYNECOLOGY

## 2024-07-11 PROCEDURE — 86780 TREPONEMA PALLIDUM: CPT | Performed by: OBSTETRICS & GYNECOLOGY

## 2024-07-11 PROCEDURE — 63600175 PHARM REV CODE 636 W HCPCS

## 2024-07-11 PROCEDURE — 86923 COMPATIBILITY TEST ELECTRIC: CPT | Mod: 91 | Performed by: OBSTETRICS & GYNECOLOGY

## 2024-07-11 PROCEDURE — 36004723: Performed by: OBSTETRICS & GYNECOLOGY

## 2024-07-11 PROCEDURE — 27000492 HC SLEEVE, SCD T/L

## 2024-07-11 PROCEDURE — 63600175 PHARM REV CODE 636 W HCPCS: Performed by: OBSTETRICS & GYNECOLOGY

## 2024-07-11 PROCEDURE — 62322 NJX INTERLAMINAR LMBR/SAC: CPT | Performed by: ANESTHESIOLOGY

## 2024-07-11 PROCEDURE — 59320 REVISION OF CERVIX: CPT | Mod: ,,, | Performed by: OBSTETRICS & GYNECOLOGY

## 2024-07-11 PROCEDURE — 37000008 HC ANESTHESIA 1ST 15 MINUTES: Performed by: OBSTETRICS & GYNECOLOGY

## 2024-07-11 PROCEDURE — 25000003 PHARM REV CODE 250: Performed by: OBSTETRICS & GYNECOLOGY

## 2024-07-11 PROCEDURE — 86901 BLOOD TYPING SEROLOGIC RH(D): CPT | Performed by: OBSTETRICS & GYNECOLOGY

## 2024-07-11 PROCEDURE — 51702 INSERT TEMP BLADDER CATH: CPT

## 2024-07-11 PROCEDURE — 36004722: Performed by: OBSTETRICS & GYNECOLOGY

## 2024-07-11 PROCEDURE — 36415 COLL VENOUS BLD VENIPUNCTURE: CPT | Performed by: OBSTETRICS & GYNECOLOGY

## 2024-07-11 PROCEDURE — 81001 URINALYSIS AUTO W/SCOPE: CPT | Performed by: OBSTETRICS & GYNECOLOGY

## 2024-07-11 PROCEDURE — 37000009 HC ANESTHESIA EA ADD 15 MINS: Performed by: OBSTETRICS & GYNECOLOGY

## 2024-07-11 PROCEDURE — 25000003 PHARM REV CODE 250

## 2024-07-11 PROCEDURE — 85025 COMPLETE CBC W/AUTO DIFF WBC: CPT | Performed by: OBSTETRICS & GYNECOLOGY

## 2024-07-11 PROCEDURE — 63600175 PHARM REV CODE 636 W HCPCS: Mod: JZ,JG | Performed by: ANESTHESIOLOGY

## 2024-07-11 PROCEDURE — 86850 RBC ANTIBODY SCREEN: CPT | Performed by: OBSTETRICS & GYNECOLOGY

## 2024-07-11 RX ORDER — SODIUM CHLORIDE, SODIUM LACTATE, POTASSIUM CHLORIDE, CALCIUM CHLORIDE 600; 310; 30; 20 MG/100ML; MG/100ML; MG/100ML; MG/100ML
INJECTION, SOLUTION INTRAVENOUS CONTINUOUS
Status: DISCONTINUED | OUTPATIENT
Start: 2024-07-11 | End: 2024-07-11

## 2024-07-11 RX ORDER — SODIUM CITRATE AND CITRIC ACID MONOHYDRATE 334; 500 MG/5ML; MG/5ML
30 SOLUTION ORAL ONCE
Status: COMPLETED | OUTPATIENT
Start: 2024-07-11 | End: 2024-07-11

## 2024-07-11 RX ORDER — EPHEDRINE SULFATE 50 MG/ML
INJECTION, SOLUTION INTRAVENOUS
Status: DISCONTINUED | OUTPATIENT
Start: 2024-07-11 | End: 2024-07-11

## 2024-07-11 RX ORDER — ONDANSETRON HYDROCHLORIDE 2 MG/ML
4 INJECTION, SOLUTION INTRAVENOUS DAILY PRN
Status: DISCONTINUED | OUTPATIENT
Start: 2024-07-11 | End: 2024-07-11 | Stop reason: HOSPADM

## 2024-07-11 RX ORDER — ONDANSETRON HYDROCHLORIDE 2 MG/ML
INJECTION, SOLUTION INTRAVENOUS
Status: DISCONTINUED | OUTPATIENT
Start: 2024-07-11 | End: 2024-07-11

## 2024-07-11 RX ORDER — BUPIVACAINE HYDROCHLORIDE 7.5 MG/ML
INJECTION, SOLUTION EPIDURAL; RETROBULBAR
Status: COMPLETED | OUTPATIENT
Start: 2024-07-11 | End: 2024-07-11

## 2024-07-11 RX ORDER — ACETAMINOPHEN 10 MG/ML
INJECTION, SOLUTION INTRAVENOUS
Status: DISCONTINUED | OUTPATIENT
Start: 2024-07-11 | End: 2024-07-11

## 2024-07-11 RX ORDER — SODIUM CHLORIDE, SODIUM GLUCONATE, SODIUM ACETATE, POTASSIUM CHLORIDE AND MAGNESIUM CHLORIDE 30; 37; 368; 526; 502 MG/100ML; MG/100ML; MG/100ML; MG/100ML; MG/100ML
INJECTION, SOLUTION INTRAVENOUS CONTINUOUS
Status: DISCONTINUED | OUTPATIENT
Start: 2024-07-11 | End: 2024-07-11

## 2024-07-11 RX ORDER — PHENYLEPHRINE HYDROCHLORIDE 10 MG/ML
INJECTION INTRAVENOUS
Status: DISCONTINUED | OUTPATIENT
Start: 2024-07-11 | End: 2024-07-11

## 2024-07-11 RX ORDER — SODIUM CHLORIDE, SODIUM LACTATE, POTASSIUM CHLORIDE, CALCIUM CHLORIDE 600; 310; 30; 20 MG/100ML; MG/100ML; MG/100ML; MG/100ML
INJECTION, SOLUTION INTRAVENOUS CONTINUOUS
Status: DISCONTINUED | OUTPATIENT
Start: 2024-07-11 | End: 2024-07-11 | Stop reason: HOSPADM

## 2024-07-11 RX ORDER — INDOMETHACIN 50 MG/1
50 SUPPOSITORY RECTAL ONCE
Status: COMPLETED | OUTPATIENT
Start: 2024-07-11 | End: 2024-07-11

## 2024-07-11 RX ORDER — INDOMETHACIN 50 MG/1
50 SUPPOSITORY RECTAL ONCE
Status: DISCONTINUED | OUTPATIENT
Start: 2024-07-11 | End: 2024-07-11

## 2024-07-11 RX ADMIN — EPHEDRINE SULFATE 25 MG: 50 INJECTION INTRAVENOUS at 07:07

## 2024-07-11 RX ADMIN — SODIUM CITRATE AND CITRIC ACID MONOHYDRATE 30 ML: 500; 334 SOLUTION ORAL at 06:07

## 2024-07-11 RX ADMIN — SODIUM CHLORIDE, POTASSIUM CHLORIDE, SODIUM LACTATE AND CALCIUM CHLORIDE: 600; 310; 30; 20 INJECTION, SOLUTION INTRAVENOUS at 07:07

## 2024-07-11 RX ADMIN — PHENYLEPHRINE HYDROCHLORIDE 100 MCG: 10 INJECTION INTRAVENOUS at 07:07

## 2024-07-11 RX ADMIN — SODIUM CHLORIDE, POTASSIUM CHLORIDE, SODIUM LACTATE AND CALCIUM CHLORIDE: 600; 310; 30; 20 INJECTION, SOLUTION INTRAVENOUS at 08:07

## 2024-07-11 RX ADMIN — SODIUM CHLORIDE, POTASSIUM CHLORIDE, SODIUM LACTATE AND CALCIUM CHLORIDE 1000 ML: 600; 310; 30; 20 INJECTION, SOLUTION INTRAVENOUS at 06:07

## 2024-07-11 RX ADMIN — ONDANSETRON 8 MG: 2 INJECTION INTRAMUSCULAR; INTRAVENOUS at 07:07

## 2024-07-11 RX ADMIN — BUPIVACAINE HYDROCHLORIDE 0.8 ML: 7.5 INJECTION, SOLUTION EPIDURAL; RETROBULBAR at 07:07

## 2024-07-11 RX ADMIN — INDOMETHACIN 50 MG: 50 SUPPOSITORY RECTAL at 06:07

## 2024-07-11 RX ADMIN — SODIUM CHLORIDE, POTASSIUM CHLORIDE, SODIUM LACTATE AND CALCIUM CHLORIDE: 600; 310; 30; 20 INJECTION, SOLUTION INTRAVENOUS at 05:07

## 2024-07-11 RX ADMIN — ACETAMINOPHEN 1000 MG: 10 INJECTION, SOLUTION INTRAVENOUS at 07:07

## 2024-07-11 NOTE — ANESTHESIA POSTPROCEDURE EVALUATION
Anesthesia Post Evaluation    Patient: Amanuel Jacobson    Procedure(s) Performed: Procedure(s) (LRB):  CERCLAGE, CERVIX (N/A)    Final Anesthesia Type: spinal      Patient location during evaluation: labor & delivery  Patient participation: Yes- Able to Participate  Level of consciousness: awake and alert  Post-procedure vital signs: reviewed and stable  Pain management: adequate  Airway patency: patent    PONV status at discharge: No PONV  Anesthetic complications: no      Cardiovascular status: blood pressure returned to baseline, hemodynamically stable and stable  Respiratory status: unassisted, spontaneous ventilation and room air  Hydration status: euvolemic  Follow-up not needed.  Comments: Spinal regressing and patient able to move legs/wiggle toes: to be discharged from PACU to Mother Baby when Criteria Met              Vitals Value Taken Time   /82 07/11/24 0820   Temp 36.6 °C (97.9 °F) 07/11/24 0806   Pulse 76 07/11/24 0820   Resp 16 07/11/24 0820   SpO2 100 % 07/11/24 0820         Event Time   Out of Recovery 08:36:00         Pain/Oziel Score: Oziel Score: 9 (7/11/2024  8:21 AM)

## 2024-07-11 NOTE — ANESTHESIA PROCEDURE NOTES
Spinal    Diagnosis: incompetant cervix for cerclage placement  Patient location during procedure: OB  Start time: 7/11/2024 7:22 AM  Timeout: 7/11/2024 7:20 AM  End time: 7/11/2024 7:24 AM    Staffing  Authorizing Provider: Jodie Rushing MD  Performing Provider: Jodie Rushing MD    Staffing  Performed by: Jodie Rushing MD  Authorized by: Jodie Rushing MD    Preanesthetic Checklist  Completed: patient identified, IV checked, site marked, risks and benefits discussed, surgical consent, monitors and equipment checked, pre-op evaluation and timeout performed  Spinal Block  Patient position: sitting  Prep: ChloraPrep  Patient monitoring: heart rate, cardiac monitor, continuous pulse ox and frequent blood pressure checks  Approach: midline  Location: L3-4  Injection technique: single shot  CSF Fluid: clear free-flowing CSF  Needle  Needle type: pencil-tip   Needle gauge: 25 G  Needle length: 3.5 in  Additional Documentation: incremental injection, negative aspiration for heme and no paresthesia on injection  Needle localization: anatomical landmarks  Assessment  Ease of block: easy  Patient's tolerance of the procedure: comfortable throughout block and no complaints  Medications:    Medications: bupivacaine (pf) (MARCAINE) injection 0.75% - Intraspinal   0.8 mL - 7/11/2024 7:24:00 AM

## 2024-07-11 NOTE — TRANSFER OF CARE
"Anesthesia Transfer of Care Note    Patient: Amanuel Jacobson    Procedure(s) Performed: Procedure(s) (LRB):  CERCLAGE, CERVIX (N/A)    Patient location: Labor and Delivery    Anesthesia Type: MAC and spinal    Transport from OR: Transported from OR on room air with adequate spontaneous ventilation    Post pain: adequate analgesia    Post assessment: no apparent anesthetic complications    Post vital signs: stable    Level of consciousness: awake, alert and oriented    Nausea/Vomiting: no nausea/vomiting    Complications: none    Transfer of care protocol was followed      Last vitals: Visit Vitals  /68 (BP Location: Right arm, Patient Position: Lying)   Pulse 92   Temp 37.1 °C (98.7 °F) (Oral)   Resp 16   Ht 5' 2" (1.575 m)   Wt 61.7 kg (136 lb)   LMP 02/27/2024   SpO2 100%   Breastfeeding No   BMI 24.87 kg/m²     "

## 2024-07-11 NOTE — OP NOTE
Chandra cervical cerclage operative report    Date of exam: 07/11/2024     Preoperative diagnosis: 19w2d  gestation with cervical incompetence    Postoperative diagnosis: Same    Operation: Chandra cervical cerclage (1 suture #5 Ethibond)    Anesthesia: Spinal    Estimated blood loss: around 25 mL of blood    Complications: None    Operative procedure: Patient was placed upon the operating table in a dorsal lithotomy position after a spinal anesthetic was started by the anesthesia team.  The perineum was prepped with antiseptic solution, and the vagina was prepped with direct visualization with Betadine solution.  Cervix was closed and about 1.5 cm long.  With the help of 2 assistants and using the right angle retractors, there was adequate visualization of the cervix.  An Chandra cervical cerclage was placed in a pursestring fashion, starting at 11:00, going as high as a at the cervicovaginal junction as possible, and including cervical stroma in a circumferential manner and finishing at 1:00.  The suture was tied with multiple knots around 12:00 and an extra 2 cm of suture was left at the edge of the knot for later visualization.  Patient tolerated procedure well and was taken out of the operating room in a stable condition.  Postop FHR check was done and was normal.

## 2024-07-12 LAB
ABO + RH BLD: NORMAL
ABO + RH BLD: NORMAL
BLD PROD TYP BPU: NORMAL
BLD PROD TYP BPU: NORMAL
BLOOD UNIT EXPIRATION DATE: NORMAL
BLOOD UNIT EXPIRATION DATE: NORMAL
BLOOD UNIT TYPE CODE: 5100
BLOOD UNIT TYPE CODE: 5100
CROSSMATCH INTERPRETATION: NORMAL
CROSSMATCH INTERPRETATION: NORMAL
DISPENSE STATUS: NORMAL
DISPENSE STATUS: NORMAL
UNIT NUMBER: NORMAL
UNIT NUMBER: NORMAL

## 2024-07-23 ENCOUNTER — TELEPHONE (OUTPATIENT)
Dept: MATERNAL FETAL MEDICINE | Facility: CLINIC | Age: 24
End: 2024-07-23

## 2024-07-25 ENCOUNTER — TELEPHONE (OUTPATIENT)
Dept: MATERNAL FETAL MEDICINE | Facility: CLINIC | Age: 24
End: 2024-07-25
Payer: MEDICAID

## 2024-07-25 NOTE — TELEPHONE ENCOUNTER
Called Pt. She stated that she will get her labs done on Wednesday of next week due to her Dr not being in the office this week on her appointment date.

## 2024-08-08 ENCOUNTER — PATIENT MESSAGE (OUTPATIENT)
Dept: MATERNAL FETAL MEDICINE | Facility: CLINIC | Age: 24
End: 2024-08-08
Payer: MEDICAID

## 2024-08-09 ENCOUNTER — OFFICE VISIT (OUTPATIENT)
Dept: MATERNAL FETAL MEDICINE | Facility: CLINIC | Age: 24
End: 2024-08-09
Payer: MEDICAID

## 2024-08-09 VITALS
HEART RATE: 103 BPM | WEIGHT: 146 LBS | HEIGHT: 62 IN | SYSTOLIC BLOOD PRESSURE: 110 MMHG | DIASTOLIC BLOOD PRESSURE: 69 MMHG | BODY MASS INDEX: 26.87 KG/M2

## 2024-08-09 DIAGNOSIS — O09.90 AT HIGH RISK FOR COMPLICATIONS OF INTRAUTERINE PREGNANCY (IUP): Primary | ICD-10-CM

## 2024-08-09 DIAGNOSIS — O09.292 PRIOR PREGNANCY WITH FETAL DEMISE AND CURRENT PREGNANCY IN SECOND TRIMESTER: ICD-10-CM

## 2024-08-09 DIAGNOSIS — O09.292 HISTORY OF PRETERM PREMATURE RUPTURE OF MEMBRANES (PROM) IN PREVIOUS PREGNANCY, CURRENTLY PREGNANT IN SECOND TRIMESTER: ICD-10-CM

## 2024-08-09 DIAGNOSIS — O34.32 MATERNAL CARE FOR CERVICAL INCOMPETENCE IN SECOND TRIMESTER: ICD-10-CM

## 2024-08-09 DIAGNOSIS — O26.872 CERVICAL SHORTENING IN SECOND TRIMESTER: ICD-10-CM

## 2024-08-09 DIAGNOSIS — O99.012 ANEMIA AFFECTING PREGNANCY IN SECOND TRIMESTER: ICD-10-CM

## 2024-08-19 DIAGNOSIS — O09.90 AT HIGH RISK FOR COMPLICATIONS OF INTRAUTERINE PREGNANCY (IUP): Primary | ICD-10-CM

## 2024-08-19 DIAGNOSIS — O26.872 CERVICAL SHORTENING IN SECOND TRIMESTER: ICD-10-CM

## 2024-08-19 DIAGNOSIS — O99.012 ANEMIA AFFECTING PREGNANCY IN SECOND TRIMESTER: ICD-10-CM

## 2024-08-21 NOTE — PROGRESS NOTES
Maternal Fetal Medicine Follow Up    Subjective:     Patient ID: [id number]    Chief Complaint: m followup w/us      HPI: Amanuel Jacobson is a 24 y.o. female  at 25w3d gestation with Estimated Date of Delivery: 12/3/24  who is here for follow-up consultation by M.    She has history of stillbirth at 20 weeks in her last pregnancy.  She reports was leaking fluid around 15 weeks, had rupture of membranes confirmed by OB office and went home for expectant management.  She ultimately experienced fetal demise in utero around 20 weeks.  She had progressive cervical shortening this pregnancy noted on 2024 and was started on nightly vaginal progesterone.  She also elected to do cerclage which was done on 2024.  APS testing was ordered on 2024.  She did the labs  and the anticardiolipin IgM and IgG were negative with the other results still pending at this time.  She is anemic.  On 2024, H&H 10.7/34.6,  iron 46, TIBC 463. She is on oral hematinic therapy.  She had followup CBC ,  H/H 10.7/34.1. She is on low-dose aspirin daily.       Interval history since last [facility] visit: None.. She denies any leaking fluid, vaginal bleeding, contractions, decreased fetal movement. Denies headaches, visual disturbances, or epigastric pain.    Pregnancy complications include:   Patient Active Problem List   Diagnosis    At high risk for complications of intrauterine pregnancy (IUP)    Anemia affecting pregnancy in second trimester    Prior pregnancy with fetal demise and current pregnancy in second trimester    History of  premature rupture of membranes (PROM) in previous pregnancy, currently pregnant in second trimester    Cervical shortening in second trimester    Maternal care for cervical incompetence in second trimester       No changes to medical, surgical, family, social, or obstetric history.    Medications:  Current Outpatient Medications   Medication Instructions    ascorbic acid (vitamin  "C) (VITAMIN C) 250 mg, Oral, Every other day    aspirin (ECOTRIN) 81 mg, Oral, Daily    ferrous sulfate (FEOSOL) 325 mg, Oral, Every other day    folic acid (FOLVITE) 1 mg, Oral, Daily    prenatal vit/iron fum/folic ac (PRENATAL 1+1 ORAL) 1 tablet, Oral, Daily    progesterone (PROMETRIUM) 200 mg, Vaginal, Nightly       Review of Systems   12 point review of systems conducted, negative except as stated in the history of present illness. See HPI for details.      Objective:     Visit Vitals  /69 (BP Location: Left arm, Patient Position: Sitting, BP Method: Large (Automatic))   Pulse 95   Ht 5' 2" (1.575 m)   Wt 67.6 kg (149 lb)   LMP 2024   BMI 27.25 kg/m²        Physical Exam  Vitals and nursing note reviewed.   Constitutional:       Appearance: Normal appearance.   HENT:      Head: Normocephalic and atraumatic.   Cardiovascular:      Rate and Rhythm: Normal rate and regular rhythm.   Pulmonary:      Effort: Pulmonary effort is normal. No respiratory distress.      Breath sounds: Normal breath sounds.   Abdominal:      Palpations: Abdomen is soft.      Tenderness: There is no abdominal tenderness.   Musculoskeletal:      Right lower leg: No edema.      Left lower leg: No edema.   Skin:     Capillary Refill: Capillary refill takes less than 2 seconds.   Neurological:      Mental Status: She is alert and oriented to person, place, and time.   Psychiatric:         Mood and Affect: Mood normal.         Behavior: Behavior normal.         Thought Content: Thought content normal.         Judgment: Judgment normal.         Assessment/Plan:     24 y.o.  female with IUP at 25w3d    History of PPROM at 15 weeks with stillbirth at 20 weeks, currently with cervical shortening, s/p cerclage  There is normal fetal growth with an EFW of 957 g at the 66% and the AC at the 73% on 24.  AFV is normal.      APS testing done .  Anticardiolipin IgG and IgM negative.  Others pending.  We will follow up on those. "     She is aware of risks associated with cerclage placement and that goal is to decrease risk of  labor and delivery. She is to continue limited activity/home rest with ambulation/exercising BLE for DVT prophylaxis, no tub baths, and pelvic rest and continue vaginal progesterone suppositories every night. She was instructed to report immediately any abdominal cramping, vaginal bleeding or change in discharge.     Cerclage could be removed at 36-37 weeks, unless indicated earlier.  PTL precautions reviewed.     With fetal anatomy scan complete, we will discharge from [facility] services at this time.  We will be happy to see her back if APS testing positive or any other new issues or concerns concerns.    Anemia in pregnancy  Anemia in pregnancy is associated with a 2-fold increased risk for  delivery and 3-fold increased risk for delivering a low birth weight baby.      With stable H/H, advised to continue oral hematinic therapy. Recommend intermittent H/H throughout the pregnancy to assess response to supplementation and guide titration of dosing.        Preeclampsia prophylaxis  With her increased risk for preeclampsia, she agreed to continue asa 81 mg daily until delivery. Preeclampsia precautions reviewed.     Fetal anatomy scan was completed.  Lupus anticoagulant and anti beta 2 microglobulin I results are pending.  If abnormal we would like to see her again.  If normal patient will continue regular pregnancy care with Dr. Vickers with cerclage to be removed at 36-37 weeks.  Vaginal progesterone we will continue to 36 weeks 6 days.  Continue hematinic therapy.  Continue daily aspirin.      Follow up for NO FU. We will see any time at OB's discretion.     No future appointments.    Patient was evaluated and examined by Dr. Clayton. MIHSA Lopez, helped in pre charting of part of note.     This note was created with the assistance of [employer] voice recognition software. There may be transcription errors as  a result of using this technology, however minimal. Effort has been made to ensure accuracy of transcription, but any obvious errors or omissions should be clarified with the author of the document.

## 2024-08-23 ENCOUNTER — PROCEDURE VISIT (OUTPATIENT)
Dept: MATERNAL FETAL MEDICINE | Facility: CLINIC | Age: 24
End: 2024-08-23
Payer: MEDICAID

## 2024-08-23 ENCOUNTER — OFFICE VISIT (OUTPATIENT)
Dept: MATERNAL FETAL MEDICINE | Facility: CLINIC | Age: 24
End: 2024-08-23
Payer: MEDICAID

## 2024-08-23 VITALS
WEIGHT: 149 LBS | DIASTOLIC BLOOD PRESSURE: 69 MMHG | BODY MASS INDEX: 27.42 KG/M2 | HEART RATE: 95 BPM | SYSTOLIC BLOOD PRESSURE: 110 MMHG | HEIGHT: 62 IN

## 2024-08-23 DIAGNOSIS — O26.872 CERVICAL SHORTENING IN SECOND TRIMESTER: ICD-10-CM

## 2024-08-23 DIAGNOSIS — O09.292 HISTORY OF PRETERM PREMATURE RUPTURE OF MEMBRANES (PROM) IN PREVIOUS PREGNANCY, CURRENTLY PREGNANT IN SECOND TRIMESTER: ICD-10-CM

## 2024-08-23 DIAGNOSIS — O09.90 AT HIGH RISK FOR COMPLICATIONS OF INTRAUTERINE PREGNANCY (IUP): ICD-10-CM

## 2024-08-23 DIAGNOSIS — O09.292 PRIOR PREGNANCY WITH FETAL DEMISE AND CURRENT PREGNANCY IN SECOND TRIMESTER: ICD-10-CM

## 2024-08-23 DIAGNOSIS — O99.012 ANEMIA AFFECTING PREGNANCY IN SECOND TRIMESTER: Primary | ICD-10-CM

## 2024-08-23 DIAGNOSIS — O34.32 MATERNAL CARE FOR CERVICAL INCOMPETENCE IN SECOND TRIMESTER: ICD-10-CM

## 2024-08-23 DIAGNOSIS — O99.012 ANEMIA AFFECTING PREGNANCY IN SECOND TRIMESTER: ICD-10-CM

## (undated) DEVICE — SET FLUID WARMER RANGER